# Patient Record
Sex: FEMALE | Race: WHITE | NOT HISPANIC OR LATINO | URBAN - METROPOLITAN AREA
[De-identification: names, ages, dates, MRNs, and addresses within clinical notes are randomized per-mention and may not be internally consistent; named-entity substitution may affect disease eponyms.]

---

## 2022-04-07 ENCOUNTER — OUTPATIENT (OUTPATIENT)
Dept: OUTPATIENT SERVICES | Facility: HOSPITAL | Age: 52
LOS: 1 days | End: 2022-04-07
Payer: COMMERCIAL

## 2022-04-07 DIAGNOSIS — R13.19 OTHER DYSPHAGIA: ICD-10-CM

## 2022-04-07 PROCEDURE — 74220 X-RAY XM ESOPHAGUS 1CNTRST: CPT | Mod: 26

## 2022-04-07 PROCEDURE — 74220 X-RAY XM ESOPHAGUS 1CNTRST: CPT

## 2023-07-21 ENCOUNTER — APPOINTMENT (OUTPATIENT)
Dept: CARDIOLOGY | Facility: CLINIC | Age: 53
End: 2023-07-21
Payer: COMMERCIAL

## 2023-07-21 ENCOUNTER — NON-APPOINTMENT (OUTPATIENT)
Age: 53
End: 2023-07-21

## 2023-07-21 VITALS
HEIGHT: 64 IN | SYSTOLIC BLOOD PRESSURE: 98 MMHG | DIASTOLIC BLOOD PRESSURE: 58 MMHG | BODY MASS INDEX: 25.1 KG/M2 | HEART RATE: 64 BPM | OXYGEN SATURATION: 99 % | WEIGHT: 147 LBS

## 2023-07-21 VITALS — DIASTOLIC BLOOD PRESSURE: 58 MMHG | SYSTOLIC BLOOD PRESSURE: 90 MMHG

## 2023-07-21 DIAGNOSIS — Z00.00 ENCOUNTER FOR GENERAL ADULT MEDICAL EXAMINATION W/OUT ABNORMAL FINDINGS: ICD-10-CM

## 2023-07-21 DIAGNOSIS — R06.09 OTHER FORMS OF DYSPNEA: ICD-10-CM

## 2023-07-21 DIAGNOSIS — Z78.9 OTHER SPECIFIED HEALTH STATUS: ICD-10-CM

## 2023-07-21 PROCEDURE — 99205 OFFICE O/P NEW HI 60 MIN: CPT | Mod: 25

## 2023-07-21 PROCEDURE — 93000 ELECTROCARDIOGRAM COMPLETE: CPT

## 2023-07-21 RX ORDER — MONTELUKAST SODIUM 10 MG/1
10 TABLET, FILM COATED ORAL
Qty: 60 | Refills: 3 | Status: ACTIVE | COMMUNITY

## 2023-07-21 RX ORDER — GLUCOSAMINE HCL/CHONDROITIN SU 500-400 MG
3 CAPSULE ORAL AT BEDTIME
Refills: 0 | Status: ACTIVE | COMMUNITY

## 2023-07-21 RX ORDER — CHLORHEXIDINE GLUCONATE 4 %
600 LIQUID (ML) TOPICAL
Refills: 0 | Status: ACTIVE | COMMUNITY

## 2023-07-21 RX ORDER — LORATADINE 5 MG/5 ML
10 SOLUTION, ORAL ORAL DAILY
Refills: 0 | Status: ACTIVE | COMMUNITY

## 2023-07-21 NOTE — HISTORY OF PRESENT ILLNESS
[FreeTextEntry1] : coronary artery disease evalaution \par \par \par This is 52 year old woman with history of dyslipidemia , here for coronary artery disease evaluation.  \par denies any chest pain . no dyspnea on exertion  someitmes dyspnea on exertion .  when she is taking up the laundry. \par she does not exercise. she is waklking.  no .dizziness. no syncope.  no palpitations.  \par \par No smoking. No alcohol. No drugs. \par  \par \par Family history: \par Father:  no myocardial infarction. no Cerebro vascular accident ; hypertension \par Mother :  no myocardial infarction. No cerebro vascualr accident ; hypertension \par Siblings:  No myocardial infarction.  No CVA ; sister: melanoma

## 2023-07-21 NOTE — DISCUSSION/SUMMARY
[Patient] : the patient [Risks] : risks [Benefits] : benefits [Alternatives] : alternatives [With Me] : with me [___ Year(s)] : in [unfilled] year(s) [FreeTextEntry1] : This is 52 year old woman with history of dyslipidemia , here for coronary artery disease evaluation.  \par \par 1) dyspnea on exertion : cardiac CTA . heart rate 64 . no metoprolol PO needed.  transthoracic echocardiogram \par 2) .health maintenance :  lipid profile.  Lipoprotein A ; lipoprotein A . \par   [EKG obtained to assist in diagnosis and management of assessed problem(s)] : EKG obtained to assist in diagnosis and management of assessed problem(s)

## 2023-09-25 ENCOUNTER — APPOINTMENT (OUTPATIENT)
Dept: CARDIOLOGY | Facility: CLINIC | Age: 53
End: 2023-09-25
Payer: COMMERCIAL

## 2023-09-25 PROCEDURE — 93306 TTE W/DOPPLER COMPLETE: CPT

## 2025-06-12 PROBLEM — Z12.39 SCREENING FOR BREAST CANCER: Status: ACTIVE | Noted: 2025-06-12

## 2025-06-12 PROBLEM — Z12.11 SCREENING FOR COLON CANCER: Status: ACTIVE | Noted: 2025-06-12

## 2025-06-16 ENCOUNTER — APPOINTMENT (OUTPATIENT)
Age: 55
End: 2025-06-16
Payer: COMMERCIAL

## 2025-06-16 VITALS
WEIGHT: 153 LBS | SYSTOLIC BLOOD PRESSURE: 110 MMHG | HEIGHT: 64 IN | HEART RATE: 74 BPM | RESPIRATION RATE: 14 BRPM | OXYGEN SATURATION: 99 % | BODY MASS INDEX: 26.12 KG/M2 | DIASTOLIC BLOOD PRESSURE: 64 MMHG

## 2025-06-16 PROBLEM — Z80.3 FAMILY HISTORY OF MALIGNANT NEOPLASM OF FEMALE BREAST: Status: ACTIVE | Noted: 2025-06-16

## 2025-06-16 PROBLEM — Z80.1 FAMILY HISTORY OF LUNG CANCER: Status: ACTIVE | Noted: 2025-06-16

## 2025-06-16 PROBLEM — Z80.8 FAMILY HISTORY OF MALIGNANT MELANOMA: Status: ACTIVE | Noted: 2025-06-16

## 2025-06-16 PROBLEM — Z01.419 ENCOUNTER FOR GYNECOLOGICAL EXAMINATION (GENERAL) (ROUTINE) WITHOUT ABNORMAL FINDINGS: Status: ACTIVE | Noted: 2025-06-16

## 2025-06-16 PROBLEM — E03.9 HYPOTHYROIDISM: Status: ACTIVE | Noted: 2025-06-16

## 2025-06-16 PROCEDURE — 99386 PREV VISIT NEW AGE 40-64: CPT

## 2025-06-16 PROCEDURE — 99459 PELVIC EXAMINATION: CPT | Mod: NC

## 2025-06-17 LAB — HPV HIGH+LOW RISK DNA PNL CVX: NOT DETECTED

## 2025-06-19 LAB — CYTOLOGY CVX/VAG DOC THIN PREP: ABNORMAL

## 2025-06-24 ENCOUNTER — APPOINTMENT (OUTPATIENT)
Age: 55
End: 2025-06-24
Payer: COMMERCIAL

## 2025-06-24 VITALS
WEIGHT: 147 LBS | DIASTOLIC BLOOD PRESSURE: 68 MMHG | HEART RATE: 75 BPM | HEIGHT: 64 IN | BODY MASS INDEX: 25.1 KG/M2 | SYSTOLIC BLOOD PRESSURE: 119 MMHG

## 2025-06-24 PROBLEM — R87.618 BENIGN-APPEARING ENDOMETRIAL CELLS ON CERVICAL CYTOLOGY: Status: ACTIVE | Noted: 2025-06-24

## 2025-06-24 LAB
HCG UR QL: NEGATIVE
QUALITY CONTROL: YES

## 2025-06-24 PROCEDURE — 81025 URINE PREGNANCY TEST: CPT

## 2025-06-24 PROCEDURE — 58100 BIOPSY OF UTERUS LINING: CPT

## 2025-06-28 LAB — CORE LAB BIOPSY: NORMAL

## 2025-06-30 ENCOUNTER — TRANSCRIPTION ENCOUNTER (OUTPATIENT)
Age: 55
End: 2025-06-30

## 2025-08-12 ENCOUNTER — NON-APPOINTMENT (OUTPATIENT)
Age: 55
End: 2025-08-12

## 2025-08-12 DIAGNOSIS — R92.30 DENSE BREASTS, UNSPECIFIED: ICD-10-CM

## 2025-08-19 PROBLEM — R92.30 DENSE BREAST TISSUE: Status: ACTIVE | Noted: 2025-08-19

## 2025-08-25 ENCOUNTER — NON-APPOINTMENT (OUTPATIENT)
Age: 55
End: 2025-08-25